# Patient Record
Sex: MALE | Race: WHITE | Employment: FULL TIME | URBAN - METROPOLITAN AREA
[De-identification: names, ages, dates, MRNs, and addresses within clinical notes are randomized per-mention and may not be internally consistent; named-entity substitution may affect disease eponyms.]

---

## 2017-07-11 ENCOUNTER — APPOINTMENT (OUTPATIENT)
Dept: CT IMAGING | Facility: HOSPITAL | Age: 22
End: 2017-07-11
Attending: EMERGENCY MEDICINE
Payer: MEDICAID

## 2017-07-11 ENCOUNTER — HOSPITAL ENCOUNTER (EMERGENCY)
Facility: HOSPITAL | Age: 22
Discharge: HOME | End: 2017-07-11
Attending: EMERGENCY MEDICINE
Payer: MEDICAID

## 2017-07-11 VITALS
DIASTOLIC BLOOD PRESSURE: 79 MMHG | TEMPERATURE: 98 F | RESPIRATION RATE: 16 BRPM | OXYGEN SATURATION: 98 % | WEIGHT: 160 LBS | HEIGHT: 68 IN | SYSTOLIC BLOOD PRESSURE: 125 MMHG | BODY MASS INDEX: 24.25 KG/M2 | HEART RATE: 68 BPM

## 2017-07-11 DIAGNOSIS — I88.0 MESENTERIC ADENITIS: Primary | ICD-10-CM

## 2017-07-11 LAB
ALBUMIN SERPL-MCNC: 4.2 G/DL (ref 3.5–4.8)
ALP LIVER SERPL-CCNC: 65 U/L (ref 45–117)
ALT SERPL-CCNC: 23 U/L (ref 17–63)
AST SERPL-CCNC: 31 U/L (ref 15–41)
BASOPHILS # BLD AUTO: 0.04 X10(3) UL (ref 0–0.1)
BASOPHILS NFR BLD AUTO: 0.5 %
BILIRUB SERPL-MCNC: 0.8 MG/DL (ref 0.1–2)
BILIRUB UR QL STRIP.AUTO: NEGATIVE
BUN BLD-MCNC: 12 MG/DL (ref 8–20)
CALCIUM BLD-MCNC: 9 MG/DL (ref 8.3–10.3)
CHLORIDE: 107 MMOL/L (ref 101–111)
CLARITY UR REFRACT.AUTO: CLEAR
CO2: 26 MMOL/L (ref 22–32)
COLOR UR AUTO: YELLOW
CREAT BLD-MCNC: 1.08 MG/DL (ref 0.7–1.3)
EOSINOPHIL # BLD AUTO: 0.11 X10(3) UL (ref 0–0.3)
EOSINOPHIL NFR BLD AUTO: 1.3 %
ERYTHROCYTE [DISTWIDTH] IN BLOOD BY AUTOMATED COUNT: 11.9 % (ref 11.5–16)
GLUCOSE BLD-MCNC: 82 MG/DL (ref 70–99)
GLUCOSE UR STRIP.AUTO-MCNC: NEGATIVE MG/DL
HCT VFR BLD AUTO: 45.7 % (ref 37–53)
HGB BLD-MCNC: 15.4 G/DL (ref 13–17)
IMMATURE GRANULOCYTE COUNT: 0.04 X10(3) UL (ref 0–1)
IMMATURE GRANULOCYTE RATIO %: 0.5 %
KETONES UR STRIP.AUTO-MCNC: NEGATIVE MG/DL
LEUKOCYTE ESTERASE UR QL STRIP.AUTO: NEGATIVE
LIPASE: 128 U/L (ref 73–393)
LYMPHOCYTES # BLD AUTO: 2.08 X10(3) UL (ref 0.9–4)
LYMPHOCYTES NFR BLD AUTO: 24.4 %
M PROTEIN MFR SERPL ELPH: 7.6 G/DL (ref 6.1–8.3)
MCH RBC QN AUTO: 30.3 PG (ref 27–33.2)
MCHC RBC AUTO-ENTMCNC: 33.7 G/DL (ref 31–37)
MCV RBC AUTO: 90 FL (ref 80–99)
MONOCYTES # BLD AUTO: 0.57 X10(3) UL (ref 0.1–0.6)
MONOCYTES NFR BLD AUTO: 6.7 %
NEUTROPHIL ABS PRELIM: 5.7 X10 (3) UL (ref 1.3–6.7)
NEUTROPHILS # BLD AUTO: 5.7 X10(3) UL (ref 1.3–6.7)
NEUTROPHILS NFR BLD AUTO: 66.6 %
NITRITE UR QL STRIP.AUTO: NEGATIVE
PH UR STRIP.AUTO: 7 [PH] (ref 4.5–8)
PLATELET # BLD AUTO: 183 10(3)UL (ref 150–450)
POTASSIUM SERPL-SCNC: 3.9 MMOL/L (ref 3.6–5.1)
PROT UR STRIP.AUTO-MCNC: NEGATIVE MG/DL
RBC # BLD AUTO: 5.08 X10(6)UL (ref 4.3–5.7)
RBC UR QL AUTO: NEGATIVE
RED CELL DISTRIBUTION WIDTH-SD: 39.1 FL (ref 35.1–46.3)
SODIUM SERPL-SCNC: 139 MMOL/L (ref 136–144)
SP GR UR STRIP.AUTO: 1.01 (ref 1–1.03)
UROBILINOGEN UR STRIP.AUTO-MCNC: <2 MG/DL
WBC # BLD AUTO: 8.5 X10(3) UL (ref 4–13)

## 2017-07-11 PROCEDURE — 99284 EMERGENCY DEPT VISIT MOD MDM: CPT

## 2017-07-11 PROCEDURE — 96361 HYDRATE IV INFUSION ADD-ON: CPT

## 2017-07-11 PROCEDURE — 96375 TX/PRO/DX INJ NEW DRUG ADDON: CPT

## 2017-07-11 PROCEDURE — 80053 COMPREHEN METABOLIC PANEL: CPT | Performed by: EMERGENCY MEDICINE

## 2017-07-11 PROCEDURE — 83690 ASSAY OF LIPASE: CPT | Performed by: EMERGENCY MEDICINE

## 2017-07-11 PROCEDURE — 85025 COMPLETE CBC W/AUTO DIFF WBC: CPT | Performed by: EMERGENCY MEDICINE

## 2017-07-11 PROCEDURE — 96374 THER/PROPH/DIAG INJ IV PUSH: CPT

## 2017-07-11 PROCEDURE — 74176 CT ABD & PELVIS W/O CONTRAST: CPT | Performed by: EMERGENCY MEDICINE

## 2017-07-11 PROCEDURE — 81003 URINALYSIS AUTO W/O SCOPE: CPT | Performed by: EMERGENCY MEDICINE

## 2017-07-11 RX ORDER — HYDROMORPHONE HYDROCHLORIDE 1 MG/ML
0.5 INJECTION, SOLUTION INTRAMUSCULAR; INTRAVENOUS; SUBCUTANEOUS ONCE
Status: COMPLETED | OUTPATIENT
Start: 2017-07-11 | End: 2017-07-11

## 2017-07-11 RX ORDER — ONDANSETRON 4 MG/1
4 TABLET, ORALLY DISINTEGRATING ORAL EVERY 4 HOURS PRN
Qty: 10 TABLET | Refills: 0 | Status: SHIPPED | OUTPATIENT
Start: 2017-07-11 | End: 2017-07-18

## 2017-07-11 RX ORDER — KETOROLAC TROMETHAMINE 30 MG/ML
15 INJECTION, SOLUTION INTRAMUSCULAR; INTRAVENOUS ONCE
Status: COMPLETED | OUTPATIENT
Start: 2017-07-11 | End: 2017-07-11

## 2017-07-11 RX ORDER — HYDROCODONE BITARTRATE AND ACETAMINOPHEN 5; 325 MG/1; MG/1
1 TABLET ORAL EVERY 4 HOURS PRN
Qty: 10 TABLET | Refills: 0 | Status: SHIPPED | OUTPATIENT
Start: 2017-07-11 | End: 2017-07-13

## 2017-07-11 RX ORDER — ONDANSETRON 2 MG/ML
4 INJECTION INTRAMUSCULAR; INTRAVENOUS ONCE
Status: COMPLETED | OUTPATIENT
Start: 2017-07-11 | End: 2017-07-11

## 2017-07-11 NOTE — ED NOTES
Pt ambulatory to the bathroom with steady gait to provide UA. Pt reports pain in R side of abd. IVF complete. VSS. A&A.  Call light in reach. md notified of pain level

## 2017-07-11 NOTE — ED INITIAL ASSESSMENT (HPI)
Pt here for abdominal pain that started on Friday.  Pain notes right sided pain but denies diarrhea and has N/V.

## 2017-07-11 NOTE — ED PROVIDER NOTES
Patient Seen in: [facility] Emergency Department    History   Patient presents with:  Abdomen/Flank Pain (GI/)    Stated Complaint: abd pain    HPI    13-year-old male complaining of abdominal pain the pain started about 5 days ago it is mainly up auscultation. Cardiovascular exam regular rate and rhythm without murmurs. Abdomen is soft and mild generalized tenderness does not localize anywhere there is no masses guarding or rebound. Without rebound or guarding.   Extremities no clubbing cyanosis needed for Nausea. Qty: 10 tablet Refills: 0    HYDROcodone-acetaminophen 5-325 MG Oral Tab  Take 1 tablet by mouth every 4 (four) hours as needed for Pain.   Qty: 10 tablet Refills: 0

## 2017-07-11 NOTE — ED NOTES
Hand off report received from Laura, [address] cares taken over by monika INMAN

## 2017-07-11 NOTE — ED NOTES
Pt returned from CT. Pt reports relief of pain after medication intervention. A&A. VSS. Family at bedside.

## 2017-09-12 ENCOUNTER — HOSPITAL ENCOUNTER (EMERGENCY)
Facility: HOSPITAL | Age: 22
Discharge: HOME | End: 2017-09-12
Attending: EMERGENCY MEDICINE
Payer: COMMERCIAL

## 2017-09-12 VITALS
WEIGHT: 165 LBS | HEIGHT: 69 IN | SYSTOLIC BLOOD PRESSURE: 137 MMHG | DIASTOLIC BLOOD PRESSURE: 76 MMHG | HEART RATE: 71 BPM | BODY MASS INDEX: 24.44 KG/M2 | TEMPERATURE: 98 F | OXYGEN SATURATION: 100 % | RESPIRATION RATE: 20 BRPM

## 2017-09-12 DIAGNOSIS — S16.1XXA STRAIN OF NECK MUSCLE, INITIAL ENCOUNTER: Primary | ICD-10-CM

## 2017-09-12 PROCEDURE — 99283 EMERGENCY DEPT VISIT LOW MDM: CPT

## 2017-09-12 RX ORDER — CYCLOBENZAPRINE HCL 10 MG
10 TABLET ORAL 3 TIMES DAILY PRN
Qty: 20 TABLET | Refills: 0 | Status: SHIPPED | OUTPATIENT
Start: 2017-09-12 | End: 2017-09-19

## 2017-09-12 RX ORDER — IBUPROFEN 800 MG/1
800 TABLET ORAL ONCE
Status: COMPLETED | OUTPATIENT
Start: 2017-09-12 | End: 2017-09-12

## 2017-09-12 RX ORDER — IBUPROFEN 800 MG/1
TABLET ORAL
Status: DISCONTINUED
Start: 2017-09-12 | End: 2017-09-12
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
## (undated) NOTE — LETTER
July 11, 2017    Patient: Clemente Allan   Date of Visit: 7/11/2017       To Whom It May Concern:    Clemente Allan was seen and treated in our emergency department on 7/11/2017.  Thursday, July 13 may return sooner if bettershould not return to work until

## (undated) NOTE — LETTER
July 11, 2017    Patient: Star Field   Date of Visit: 7/11/2017       To Whom It May Concern:    Star Field was seen and treated in our emergency department on 7/11/2017.  He should not return to work until Thursday, July 13 may return sooner if bet

## (undated) NOTE — LETTER
September 13, 2017    Patient: Flakita Phlegm   Date of Visit: 9/12/2017       To Whom It May Concern:    Bethany Cruz was seen and treated in our emergency department on 9/12/2017. He may return to work on 9/15/17.     If you have any questions or conc

## (undated) NOTE — ED AVS SNAPSHOT
[facility] Emergency Department  [place]  [address] [address]  Phone:  [phone]  Fax:  [phone]   MRN: [id number]    Department:  [facility] Emergency Department   Date of Visit:  7/11/2017 CARE PHYSICIAN AT ONCE OR RETURN IMMEDIATELY TO THE EMERGENCY DEPARTMENT.     If you have been prescribed any medication(s), please fill your prescription right away and begin taking the medication(s) as directed    If the emergency physician has read X-ray

## (undated) NOTE — ED AVS SNAPSHOT
Pebbles Ley   MRN: [id number]    Department:  [facility] Emergency Department   Date of Visit:  9/12/2017           Disclosure     Insurance plans vary and the physician(s) referred by the ER may not be covered by your plan.  Please contact your If you have been prescribed any medication(s), please fill your prescription right away and begin taking the medication(s) as directed    If the emergency physician has read X-rays, these will be re-interpreted by a radiologist.  If there is a significant